# Patient Record
(demographics unavailable — no encounter records)

---

## 2017-01-30 NOTE — ER DOCUMENT REPORT
ED Medical Screen (RME)





- General


Chief Complaint: Headache


Stated Complaint: HEAD PAIN


Notes: 


Patient is an 8-year-old female who has a cyst on the back of her head and sent 

for evaluation. denies, pain or swelling





I have greeted and performed a rapid initial assessment of this patient. A 

comprehensive ED assessment and evaluation of the patient, analysis of test 

results and completion of the medical decision making process will be conducted 

by additional ED providers.


TRAVEL OUTSIDE OF THE [place] IN LAST 30 DAYS: No





- Related Data


Allergies/Adverse Reactions: 


 





insect bites Allergy (Mild, Uncoded 01/30/17 18:25)


 











Past Medical History


Pulmonary Medical History: Reports: Hx Asthma


Skin Medical History: Reports Hx Eczema





- Immunizations


Immunizations up to date: Yes


Hx Diphtheria, Pertussis, Tetanus Vaccination: Yes





Physical Exam





- Vital signs


Vitals: 





 











Temp Pulse Resp BP Pulse Ox


 


 98.1 F   88   20   116/77   100 


 


 01/30/17 18:23  01/30/17 18:23  01/30/17 18:23  01/30/17 18:23  01/30/17 18:23














Course





- Vital Signs


Vital signs: 





 











Temp Pulse Resp BP Pulse Ox


 


 98.1 F   88   20   116/77   100 


 


 01/30/17 18:23  01/30/17 18:23  01/30/17 18:23  01/30/17 18:23  01/30/17 18:23

## 2017-01-30 NOTE — ER DOCUMENT REPORT
ED Skin Rash/Insect Bite/Abscs





- General


Time seen by provider: 20:00


Mode of Arrival: Ambulatory


Information source: Patient


TRAVEL OUTSIDE OF THE [place] IN LAST 30 DAYS: No





- HPI


Patient complains to provider of: Tender/swollen area


Onset: Other - see HPI


Skin Character: Swelling


Quality of rash: Painful





<HUSSEIN GARCIA - Last Filed: 01/30/17 21:46>





- General


TRAVEL OUTSIDE OF THE [place] IN LAST 30 DAYS: No





<PRATIMA PALACIOS - Last Filed: 01/31/17 00:52>





- General


Chief Complaint: Cyst


Stated Complaint: HEAD PAIN


Notes: 


Patient is a-year-old female presenting with she department with complaints of 

a tender swollen area to the back side of her head.  Patient noticed a "lump" 

on the left occipital area of her head and it was also examined by the school 

nurse today.  Patient states that this area is tender.  Patient has history of 

allergies, eczema, and asthma.  Patient has some rhinorrhea.  Patient denies 

any other recent illness, sore throat, cough etc. (HUSSEIN GARCIA)





- Related Data


Allergies/Adverse Reactions: 


 





insect bites Allergy (Mild, Uncoded 01/30/17 18:25)


 











Past Medical History





- General


Information source: Parent





- Social History


Smoking Status: Never Smoker


Cigarette use (# per day): No


Chew tobacco use (# tins/day): No


Smoking Education Provided: No


Frequency of alcohol use: None


Drug Abuse: None


Family History: Reviewed & Not Pertinent, Arthritis, CAD, CVA, DM, 

Hyperlipidemia, Hypertension, Malignancy


Patient has suicidal ideation: No


Patient has homicidal ideation: No


Pulmonary Medical History: Reports: Hx Asthma


Skin Medical History: Reports Hx Eczema


Surgical Hx: Negative





- Immunizations


Immunizations up to date: Yes


Hx Diphtheria, Pertussis, Tetanus Vaccination: Yes





<HUSSEIN GARCIA - Last Filed: 01/30/17 21:46>





- Social History


Smoking Status: Never Smoker


Chew tobacco use (# tins/day): No


Frequency of alcohol use: None


Drug Abuse: None


Family History: Arthritis, CAD, CVA, DM, Hyperlipidemia, Hypertension, 

Malignancy


Patient has suicidal ideation: No


Patient has homicidal ideation: No


Pulmonary Medical History: Reports: Hx Asthma


Renal/ Medical History: Denies: Hx Peritoneal Dialysis


Skin Medical History: Reports Hx Eczema





- Immunizations


Immunizations up to date: Yes


Hx Diphtheria, Pertussis, Tetanus Vaccination: Yes





<PRAITMA PALACIOS - Last Filed: 01/31/17 00:52>





Review of Systems





- Review of Systems


Constitutional: No symptoms reported


EENT: No symptoms reported


Cardiovascular: No symptoms reported


Respiratory: No symptoms reported


Gastrointestinal: No symptoms reported


Genitourinary: No symptoms reported


Female Genitourinary: No symptoms reported


Musculoskeletal: No symptoms reported


Skin: See HPI


Hematologic/Lymphatic: No symptoms reported


Neurological/Psychological: No symptoms reported


-: Yes All other systems reviewed and negative





<HUSSEIN GARCIA - Last Filed: 01/30/17 21:46>





Physical Exam





- Vital signs


Interpretation: Normal





- General


General appearance: Appears well, Alert


General appearance pediatric: Attentiveness normal, Good eye contact





- HEENT


Head: Normocephalic, Atraumatic, Other - tender raised area to the left 

occipital region


Eyes: Normal


Pupils: PERRL


Mucous membranes: Moist


Neck: Normal.  No: Lymphadenopathy





- Respiratory


Respiratory status: No respiratory distress


Chest status: Nontender


Breath sounds: Normal


Chest palpation: Normal





- Cardiovascular


Rhythm: Regular


Heart sounds: Normal auscultation


Murmur: No





- Abdominal


Inspection: Normal


Distension: No distension


Bowel sounds: Normal


Tenderness: Nontender


Organomegaly: No organomegaly





- Back


Back: Normal, Nontender





- Extremities


General upper extremity: Normal inspection, Normal ROM, Normal strength


General lower extremity: Normal inspection, Normal ROM, Normal strength





- Neurological


Neuro grossly intact: Yes


Cognition: Normal


Orientation: AAOx4


Ped [place] Coma Scale Eye Opening: Spontaneous


Ped [place] Coma Scale Verbal: Age appropriate verbal


Ped [place] Coma Scale Motor: Spontaneous Movements


Pediatric [place] Coma Scale Total: 15


Speech: Normal





- Psychological


Associated symptoms: Normal affect, Normal mood





- Skin


Skin Temperature: Warm


Skin Moisture: Dry





<HUSSEIN GARCIA - Last Filed: 01/30/17 21:46>





Course





<HUSSEIN GARCIA - Last Filed: 01/30/17 21:46>





<PRATIMA PALACIOS - Last Filed: 01/31/17 00:52>





- Re-evaluation


Re-evalutation: 





01/30


Patient with area that is raised on the back for head.  Likely sebaceous cyst 

or enlarged lymph node.  It is not erythematous or fluctuant.  No indication to 

incise at this time.  She is to follow-up with her pediatrician and try to 

avoid brushing it.  Stable for discharge home.  Mother agrees with this plan.


 (PRATIMA PALACIOS)





- Vital Signs


Vital signs: 


 











Temp Pulse Resp BP Pulse Ox


 


 98.1 F   88   20   116/77   100 


 


 01/30/17 18:23  01/30/17 18:23  01/30/17 18:23  01/30/17 18:23  01/30/17 18:23








 (HUSSEIN GARCIA)


 (PRATIMA PALACIOS)





Discharge





<HUSSEIN GARCIA - Last Filed: 01/30/17 21:46>





<PRATIMA PALACIOS - Last Filed: 01/31/17 00:52>





- Discharge


Clinical Impression: 


 Lymph node enlargement





Condition: Stable


Disposition: HOME, SELF-CARE


Instructions:  Lymphadenopathy ([facility])


Forms:  Parent Work Note


Referrals: 


CARTER GREEN MD [Primary Care Provider] - Follow up as needed


Scribe Attestation: 





01/31/17 00:52


I personally performed the services described in the documentation, reviewed 

and edited the documentation which was dictated to the scribe in my presence, 

and it accurately records my words and actions. (PRATIMA PALACIOS)





Scribe Documentation





- Scribe


Written by Ronnie:: Hussein Garcia 1/30/17 21:45


acting as scribe for :: Nicole





<HUSSEIN GARCIA - Last Filed: 01/30/17 21:46>

## 2017-06-01 NOTE — ER DOCUMENT REPORT
ED Hand/Wrist Injury





- General


Chief Complaint: Finger Injury


Stated Complaint: LEFT HAND INJURY


Time Seen by Provider: 06/01/17 23:21


Mode of Arrival: Ambulatory


Information source: Patient, Parent


TRAVEL OUTSIDE OF THE [place] IN LAST 30 DAYS: No





- HPI


Patient complains to provider of: Left third and fourth finger injury


Injury to: Middle finger, Ring finger


Onset: This evening


Where: Outdoors


Timing: Constant


Quality of pain: Achy


Severity: Moderate


Pain Level: 2


Context: Crush


Notes: 


9-year-old female who was brought to the emergency room by mother for 

complaints of injury to left third and fourth finger, around 7 PM patient 

accidentally slammed her hand in a car door causing her injury, denies any 

other injury or pain, otherwise healthy child with vaccinations up





- Related Data


Allergies/Adverse Reactions: 


 





No Known Allergies Allergy (Unverified 06/01/17 23:12)


 











Past Medical History





- General


Information source: Parent





- Social History


Smoking Status: Never Smoker


Family History: Arthritis, CAD, CVA, DM, Hyperlipidemia, Hypertension, 

Malignancy


Patient has suicidal ideation: No


Patient has homicidal ideation: No


Pulmonary Medical History: Reports: Hx Asthma


Renal/ Medical History: Denies: Hx Peritoneal Dialysis


Skin Medical History: Reports Hx Eczema





- Immunizations


Immunizations up to date: Yes


Hx Diphtheria, Pertussis, Tetanus Vaccination: Yes





Review of Systems





- Review of Systems


Constitutional: No symptoms reported


EENT: No symptoms reported


Cardiovascular: No symptoms reported


Respiratory: No symptoms reported


Gastrointestinal: No symptoms reported


Genitourinary: No symptoms reported


Female Genitourinary: No symptoms reported


Musculoskeletal: See HPI


Skin: No symptoms reported


Hematologic/Lymphatic: No symptoms reported


Neurological/Psychological: No symptoms reported


-: Yes All other systems reviewed and negative





Physical Exam





- Vital signs


Vitals: 


 











Temp Pulse Resp BP Pulse Ox


 


 98.6 F   101 H  22   127/88   99 


 


 06/01/17 23:09  06/01/17 23:09  06/01/17 23:09  06/01/17 23:09 06/01/17 23:09














- Notes


Notes: 


- General


General appearance: Appears well, Alert


In distress: None





- HEENT


Head: Normocephalic, Atraumatic


Eyes: Normal


Conjunctiva: Normal


Extraocular movements intact: Yes


Eyelashes: Normal


Pupils: PERRL





- Respiratory


Respiratory status: No respiratory distress





- Cardiovascular


Rhythm: Regular





- Abdominal


Inspection: Normal





- Back


Back: Normal





- Extremities


General upper extremity: left hand with mild erythema to the dorsal third and 

fourth digits between the DIP and PIP, distal sensation and motor is intact 

with brisk capillary refill


General lower extremity: Normal inspection





- Neurological


Neuro grossly intact: Yes


Orientation: AAOx4


[place] Coma Scale Eye Opening: Spontaneous


[place] Coma Scale Verbal: Oriented


[place] Coma Scale Motor: Obeys Commands


[place] Coma Scale Total: 15





- Psychological


Associated symptoms: Normal affect, Normal mood





- Skin


Skin Temperature: Warm


Skin Moisture: Dry


Skin Color: Normal





Course





- Re-evaluation


Re-evalutation: 





06/01/17 23:54


Findings discussed with mother at bedside, which are unremarkable, patient was 

advised to apply ice and elevate, Tylenol or Motrin as needed for pain, return 

if symptoms worsen, mother acknowledges understanding and agreement with this 

plan





- Vital Signs


Vital signs: 


 











Temp Pulse Resp BP Pulse Ox


 


 98.6 F   101 H  22   127/88   99 


 


 06/01/17 23:09  06/01/17 23:09  06/01/17 23:09  06/01/17 23:09 06/01/17 23:09














- Diagnostic Test


Radiology reviewed: Image reviewed, Reports reviewed





Discharge





- Discharge


Clinical Impression: 


Finger contusion


Qualifiers:


 Encounter type: initial encounter Finger: ring finger Damage to nail status: 

without damage Laterality: left Qualified Code(s): S60.042A - Contusion of left 

ring finger without damage to nail, initial encounter





Finger contusion


Qualifiers:


 Encounter type: initial encounter Finger: ring finger Damage to nail status: 

without damage Laterality: left Qualified Code(s): S60.042A - Contusion of left 

ring finger without damage to nail, initial encounter





Condition: Stable


Disposition: HOME, SELF-CARE


Instructions:  Contusion ([facility]), Ice Packs ([facility]), Ice & Elevation ([facility])


Additional Instructions: 


Tylenol or Motrin as needed for pain.  Follow-up with your pediatrician in one 

to 2 days as needed.  Return to the emergency room immediately if symptoms 

worsen or any additional concerns.


Forms:  Return to School

## 2017-06-02 NOTE — RADIOLOGY REPORT (SQ)
EXAM DESCRIPTION:  HAND LEFT 3 VIEWS



COMPLETED DATE/TIME:  6/1/2017 11:48 pm



REASON FOR STUDY:  injury



COMPARISON:  None.



EXAM PARAMETERS:  NUMBER OF VIEWS: Three views.

TECHNIQUE: AP, lateral and oblique  radiographic images acquired of the left hand.

LIMITATIONS: None.



FINDINGS:  MINERALIZATION: Normal.

BONES: No acute fracture or dislocation.  No worrisome bone lesions.

JOINTS: No effusions.

SOFT TISSUES: No soft tissue swelling.  No foreign body.

OTHER: No other significant finding.



IMPRESSION:  NEGATIVE STUDY OF THE LEFT HAND. NO RADIOGRAPHIC EVIDENCE OF ACUTE INJURY.



TECHNICAL DOCUMENTATION:  JOB ID:  [id number]

 2011 [employer]- All Rights Reserved

## 2017-10-01 NOTE — ER DOCUMENT REPORT
HPI





- HPI


Patient complains to provider of: insect bite right lower leg


Onset: Just prior to arrival - 1930


Onset/Duration: Sudden


Quality of pain: Other - itching


Pain Level: 2


Context: 





10 yo female with hx allergy to bug bites brought in by mom because of a itchy 

bite right lateral proximal lower leg. No chest pain, sob or wheeze. no 

benadryl given.


Associated Symptoms: None


Exacerbated by: Denies


Relieved by: Denies


Similar symptoms previously: Yes


Recently seen / treated by doctor: No





- ROS


ROS below otherwise negative: Yes


Systems Reviewed and Negative: Yes All other systems reviewed and negative





- CARDIOVASCULAR


Cardiovascular: DENIES: Chest pain





- REPRODUCTIVE


Reproductive: DENIES: Pregnant:





- DERM


Skin Color: Normal





Past Medical History





- General


Information source: Parent





- Social History


Lives with: Parents


Family History: Arthritis, CAD, CVA, DM, Hyperlipidemia, Hypertension, 

Malignancy


Patient has suicidal ideation: No


Patient has homicidal ideation: No


Pulmonary Medical History: Reports: Hx Asthma


Renal/ Medical History: Denies: Hx Peritoneal Dialysis


Skin Medical History: Reports Hx Eczema


Surgical Hx: Negative





- Immunizations


Immunizations up to date: Yes


Hx Diphtheria, Pertussis, Tetanus Vaccination: Yes





Vertical Provider Document





- CONSTITUTIONAL


Agree With Documented VS: Yes


Exam Limitations: No Limitations


General Appearance: No Apparent Distress





- INFECTION CONTROL


TRAVEL OUTSIDE OF THE [place] IN LAST 30 DAYS: No





- HEENT


HEENT: Normal ENT Exam





- NECK


Neck: Supple





- RESPIRATORY


Respiratory: Breath Sounds Normal, No Respiratory Distress





- CARDIOVASCULAR


Cardiovascular: Regular Rate, Regular Rhythm





- GI/ABDOMEN


Gastrointestinal: Abdomen Soft, Abdomen Non-Tender





- MUSCULOSKELETAL/EXTREMETIES


Musculoskeletal/Extremeties: MAEW, FROM, Non-Tender





- NEURO


Level of Consciousness: Awake, Alert





- DERM


Notes: 





pink indurated 1.5 cm insect bite proximal right lower lateral leg





Discharge





- Discharge


Clinical Impression: 


 insect bite, swollen insect bite





Condition: Good


Disposition: HOME, SELF-CARE


Instructions:  Use of Diphenhydramine, Insect Bites ([facility]), Swollen Insect Bite 

or Sting ([facility])


Additional Instructions: 


cool compress


to er any concerns





Please complete the patient satisfaction survey if you get one, and return it.. 

If you do not receive a survey,  then you can go to the [facility] website, [email or web address] 

and place your comments about your very good care. Thank you very much. It was 

a pleasure being your medical provider today.


Referrals: 


CARTER GREEN MD [Primary Care Provider] - Follow up as needed